# Patient Record
Sex: FEMALE | Race: WHITE | NOT HISPANIC OR LATINO
[De-identification: names, ages, dates, MRNs, and addresses within clinical notes are randomized per-mention and may not be internally consistent; named-entity substitution may affect disease eponyms.]

---

## 2019-07-16 ENCOUNTER — APPOINTMENT (OUTPATIENT)
Dept: BREAST CENTER | Facility: CLINIC | Age: 69
End: 2019-07-16
Payer: MEDICARE

## 2019-07-16 VITALS
WEIGHT: 220 LBS | HEART RATE: 70 BPM | DIASTOLIC BLOOD PRESSURE: 70 MMHG | BODY MASS INDEX: 34.53 KG/M2 | SYSTOLIC BLOOD PRESSURE: 130 MMHG | HEIGHT: 67 IN

## 2019-07-16 DIAGNOSIS — Z78.9 OTHER SPECIFIED HEALTH STATUS: ICD-10-CM

## 2019-07-16 DIAGNOSIS — Z86.79 PERSONAL HISTORY OF OTHER DISEASES OF THE CIRCULATORY SYSTEM: ICD-10-CM

## 2019-07-16 DIAGNOSIS — Z86.39 PERSONAL HISTORY OF OTHER ENDOCRINE, NUTRITIONAL AND METABOLIC DISEASE: ICD-10-CM

## 2019-07-16 DIAGNOSIS — Z86.59 PERSONAL HISTORY OF OTHER MENTAL AND BEHAVIORAL DISORDERS: ICD-10-CM

## 2019-07-16 DIAGNOSIS — Z87.09 PERSONAL HISTORY OF OTHER DISEASES OF THE RESPIRATORY SYSTEM: ICD-10-CM

## 2019-07-16 DIAGNOSIS — Z87.891 PERSONAL HISTORY OF NICOTINE DEPENDENCE: ICD-10-CM

## 2019-07-16 DIAGNOSIS — Z87.39 PERSONAL HISTORY OF OTHER DISEASES OF THE MUSCULOSKELETAL SYSTEM AND CONNECTIVE TISSUE: ICD-10-CM

## 2019-07-16 PROCEDURE — 99215 OFFICE O/P EST HI 40 MIN: CPT

## 2019-07-16 RX ORDER — DIGOXIN 0.06 MG/1
TABLET ORAL
Refills: 0 | Status: ACTIVE | COMMUNITY

## 2019-07-16 RX ORDER — METFORMIN HYDROCHLORIDE 625 MG/1
TABLET ORAL
Refills: 0 | Status: ACTIVE | COMMUNITY

## 2019-07-16 RX ORDER — ALPRAZOLAM 0.5 MG/1
0.5 TABLET ORAL
Refills: 0 | Status: ACTIVE | COMMUNITY

## 2019-07-16 RX ORDER — RANITIDINE 75 MG/1
TABLET ORAL
Refills: 0 | Status: ACTIVE | COMMUNITY

## 2019-07-16 RX ORDER — CARVEDILOL 3.12 MG/1
TABLET, FILM COATED ORAL
Refills: 0 | Status: ACTIVE | COMMUNITY

## 2019-07-16 RX ORDER — RIVAROXABAN 2.5 MG/1
TABLET, FILM COATED ORAL
Refills: 0 | Status: ACTIVE | COMMUNITY

## 2019-07-16 RX ORDER — ROSUVASTATIN CALCIUM 5 MG/1
TABLET, FILM COATED ORAL
Refills: 0 | Status: ACTIVE | COMMUNITY

## 2019-07-16 NOTE — ASSESSMENT
[FreeTextEntry1] : 68 yo female with dense breast\par never went to derm\par plan mg/sono AUG 2019\par We reviewed risk reduction strategies including maintaining a BMI <25, limiting red meat intake and alcoholic beverages to 3 per week and exercise (150 min/ week low intensity or 75 min/week high intensity). And maintaining a normal vitamin D level.\par f/u 1 year for CBE\par She knows to call or return sooner should any concerns or questions arise.\par \par \par

## 2019-07-16 NOTE — PHYSICAL EXAM
[Normocephalic] : normocephalic [Atraumatic] : atraumatic [Supple] : supple [No Supraclavicular Adenopathy] : no supraclavicular adenopathy [Examined in the supine and seated position] : examined in the supine and seated position [No dominant masses] : no dominant masses in right breast  [No dominant masses] : no dominant masses left breast [No Nipple Retraction] : no left nipple retraction [No Nipple Discharge] : no left nipple discharge [No Axillary Lymphadenopathy] : no left axillary lymphadenopathy [No Edema] : no edema [No Rashes] : no rashes [No Ulceration] : no ulceration [de-identified] : no skin thickening

## 2019-07-16 NOTE — HISTORY OF PRESENT ILLNESS
[FreeTextEntry1] : This is a 69 yr old female s/p right breast 6:00 N3 usd guided biopsy (heart shaped clip) on 02/05/2018. Pathology showed benign breast tissue with dilated ducts, mild ductal hyperplasia and adenosis with microcalcifications, not atypia or malignancy seen. Patient is s/p left breast stereotactic biopsy on 01/10/2017. Pathology showed fibroadenomatous nodules with microcalcifications, adenosis with microcalcifications, fibrocystic changes with microcalcifications and focal mild UDH. Patient originally referred by Dr. Brar for a left breast ultrasound BIRADS 3 showing intramammary lymph node in the left 3:00 axis 8 cm from the nipple likely corresponding with the mammographic abnormality.\par \par \par Patient has no breast complaints today.  She feels the left breast skin thickening has resolved. She never saw anyone for it.  She is s/p right UOQ stereo bx 8/14/18 pathology shows small FA with UDH.\par \par She does SBE. \par She has not noticed a change in her breast or a breast lump.\par She has not noticed a change in her nipple or nipple area.\par She has not noticed a change in the skin of the breast.\par She is not experiencing nipple discharge.\par She is not experiencing breast pain.\par She has not noticed a lump or lymph node under the armpit. \par \par BREAST CANCER RISK FACTORS\par Menarche: 12\par Date of LMP: 1994\par Menopause: post \par Grav: 2      Para: 2 \par Age at first live birth: 18\par Nursed: Yes\par Hysterectomy: partial hysterectomy 1994\par Oophorectomy: No \par OCP: Yes in the past \par HRT: No \par Last pap/pelvic exam: 10/2017   WNL \par Related family history: None\par Ashkenazi: N\par Tyrer-Aj/NCI lifetime risk: BRCAPRO 5.8%, TCv6 3.3%, TCv7 5.4%, Arpita 4.5% Jeremie Not Applicable \par BRCA testing: No\par \par Last mammogram:   01/29/2018               Location: [place]\par Report reviewed.                                 Images reviewed.\par Results: Birads 0 \par Additional imaging recommended. Done on 01/29/2018. \par \par Last ultrasound:  1/29/2018               Location: [place]\par Report reviewed.                                 Images reviewed.\par Results: Birads 4\par Mass in the 6:00 axis of the right breast represents a complicated cyst vs duct with internal tissue. USD guided biopsy is recommended. Done on 02/05/2018. \par \par Last MRI:      Never

## 2020-11-03 ENCOUNTER — APPOINTMENT (OUTPATIENT)
Dept: BREAST CENTER | Facility: CLINIC | Age: 70
End: 2020-11-03
Payer: MEDICARE

## 2020-11-03 VITALS — HEIGHT: 67 IN | WEIGHT: 220 LBS | BODY MASS INDEX: 34.53 KG/M2

## 2020-11-03 PROCEDURE — 99072 ADDL SUPL MATRL&STAF TM PHE: CPT

## 2020-11-03 PROCEDURE — 99215 OFFICE O/P EST HI 40 MIN: CPT

## 2020-11-03 NOTE — ASSESSMENT
[FreeTextEntry1] : 71 yo female with dense breast\par never went to derm\par plan mg/sono OCT 2021\par We reviewed risk reduction strategies including maintaining a BMI <25, limiting red meat intake and alcoholic beverages to 3 per week and exercise (150 min/ week low intensity or 75 min/week high intensity). And maintaining a normal vitamin D level.\par f/u 1 year for CBE\par She knows to call or return sooner should any concerns or questions arise.\par \par \par

## 2020-11-03 NOTE — HISTORY OF PRESENT ILLNESS
[FreeTextEntry1] : This is a 70 yr old female s/p right breast 6:00 N3 usd guided biopsy (heart shaped clip) on 02/05/2018. Pathology showed benign breast tissue with dilated ducts, mild ductal hyperplasia and adenosis with microcalcifications, not atypia or malignancy seen. Patient is s/p left breast stereotactic biopsy on 01/10/2017. Pathology showed fibroadenomatous nodules with microcalcifications, adenosis with microcalcifications, fibrocystic changes with microcalcifications and focal mild UDH. Patient originally referred by Dr. Brar for a left breast ultrasound BIRADS 3 showing intramammary lymph node in the left 3:00 axis 8 cm from the nipple likely corresponding with the mammographic abnormality.\par \par \par Patient has no breast complaints today.  She feels the left breast skin thickening has resolved. She never saw anyone for it.  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 \par She has not noticed a change in her breast or a breast lump.\par She has not noticed a change in her nipple or nipple area.\par She has not noticed a change in the skin of the breast.\par She is not experiencing nipple discharge.\par She is not experiencing breast pain.\par She has not noticed a lump or lymph node under the armpit. \par \par BREAST CANCER RISK FACTORS\par Menarche: 12\par Date of LMP: 1994\par Menopause: post \par Grav: 2      Para: 2 \par Age at first live birth: 18\par Nursed: Yes\par Hysterectomy: partial hysterectomy 1994\par Oophorectomy: No \par OCP: Yes in the past \par HRT: No \par Last pap/pelvic exam: 2019  WNL \par Related family history: None\par Ashkenazi: N\par Tyrer-Aj/NCI lifetime risk: BRCAPRO 5.8%, TCv6 3.3%, TCv7 5.4%, Arpita 4.5% Jeremie Not Applicable \par BRCA testing: No\par \par Last mammogram: 10/5/2020               Location: [place]\par Report reviewed.                                 Images reviewed.\par Results: Birads 2\par No suspicious findings.  \par \par Last ultrasound: 10/5/2020               Location: [place]\par Report reviewed.                                 Images reviewed.\par Results: Birads 2\par no suspicious findings.\par \par Last MRI:      Never

## 2020-11-03 NOTE — PHYSICAL EXAM
[Normocephalic] : normocephalic [Atraumatic] : atraumatic [Supple] : supple [No Supraclavicular Adenopathy] : no supraclavicular adenopathy [Examined in the supine and seated position] : examined in the supine and seated position [No dominant masses] : no dominant masses in right breast  [No dominant masses] : no dominant masses left breast [No Nipple Retraction] : no left nipple retraction [No Nipple Discharge] : no left nipple discharge [No Axillary Lymphadenopathy] : no left axillary lymphadenopathy [No Edema] : no edema [No Rashes] : no rashes [No Ulceration] : no ulceration [de-identified] : no skin thickening

## 2021-11-09 ENCOUNTER — NON-APPOINTMENT (OUTPATIENT)
Age: 71
End: 2021-11-09

## 2021-11-09 ENCOUNTER — APPOINTMENT (OUTPATIENT)
Dept: BREAST CENTER | Facility: CLINIC | Age: 71
End: 2021-11-09
Payer: MEDICARE

## 2021-11-09 VITALS — HEIGHT: 67 IN | BODY MASS INDEX: 35.31 KG/M2 | WEIGHT: 225 LBS

## 2021-11-09 DIAGNOSIS — R92.1 MAMMOGRAPHIC CALCIFICATION FOUND ON DIAGNOSTIC IMAGING OF BREAST: ICD-10-CM

## 2021-11-09 DIAGNOSIS — Z78.9 OTHER SPECIFIED HEALTH STATUS: ICD-10-CM

## 2021-11-09 DIAGNOSIS — N63.0 UNSPECIFIED LUMP IN UNSPECIFIED BREAST: ICD-10-CM

## 2021-11-09 PROCEDURE — 99213 OFFICE O/P EST LOW 20 MIN: CPT

## 2021-11-09 RX ORDER — AMLODIPINE BESYLATE 5 MG/1
TABLET ORAL
Refills: 0 | Status: DISCONTINUED | COMMUNITY
End: 2021-11-09

## 2021-11-09 RX ORDER — MULTIVITAMIN
CAPSULE ORAL
Refills: 0 | Status: DISCONTINUED | COMMUNITY
End: 2021-11-09

## 2021-11-09 RX ORDER — B-COMPLEX WITH VITAMIN C
TABLET ORAL
Refills: 0 | Status: ACTIVE | COMMUNITY

## 2021-11-09 RX ORDER — CYANOCOBALAMIN (VITAMIN B-12) 1000 MCG
TABLET ORAL
Refills: 0 | Status: DISCONTINUED | COMMUNITY
End: 2021-11-09

## 2021-11-09 NOTE — HISTORY OF PRESENT ILLNESS
[FreeTextEntry1] : This is a 71 yr old female s/p right breast 6:00 N3 usd guided biopsy (heart shaped clip) on 02/05/2018. Pathology showed benign breast tissue with dilated ducts, mild ductal hyperplasia and adenosis with microcalcifications, not atypia or malignancy seen. Patient is s/p left breast stereotactic biopsy on 01/10/2017. Pathology showed fibroadenomatous nodules with microcalcifications, adenosis with microcalcifications, fibrocystic changes with microcalcifications and focal mild UDH. Patient originally referred by Dr. Brar for a left breast ultrasound BIRADS 3 showing intramammary lymph node in the left 3:00 axis 8 cm from the nipple likely corresponding with the mammographic abnormality.\par \par \par Patient has no breast complaints today.  She feels the left breast skin thickening has resolved. She never saw anyone for it.  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 She had her left knee replaced May 2021.\par \par She does SBE. \par She has not noticed a change in her breast or a breast lump.\par She has not noticed a change in her nipple or nipple area.\par She has not noticed a change in the skin of the breast.\par She is not experiencing nipple discharge.\par She is not experiencing breast pain.\par She has not noticed a lump or lymph node under the armpit. \par \par BREAST CANCER RISK FACTORS\par Menarche: 12\par Date of LMP: 1994\par Menopause: post \par Grav: 2      Para: 2 \par Age at first live birth: 18\par Nursed: Yes\par Hysterectomy: partial hysterectomy 1994\par Oophorectomy: No \par OCP: Yes in the past \par HRT: No \par Last pap/pelvic exam: 2019 WNL \par Related family history: None\par Ashkenazi: N\par Tyrer-[place]/NCI lifetime risk: BRCAPRO 5.8%, TCv6 3.3%, TCv7 5.4%, Arpita 4.5% Jeremie Not Applicable \par BRCA testing: No\par \par Last mammogram: 10/5/2020               Location: [place]\par Report reviewed.                                 Images reviewed.\par Results: Birads 2\par No suspicious findings.  \par \par Last ultrasound: 10/5/2020               Location: [place]\par Report reviewed.                                 Images reviewed.\par Results: Birads 2\par no suspicious findings.\par \par Last MRI:      Never

## 2021-11-09 NOTE — PHYSICAL EXAM
[Normocephalic] : normocephalic [Atraumatic] : atraumatic [Supple] : supple [No Supraclavicular Adenopathy] : no supraclavicular adenopathy [Examined in the supine and seated position] : examined in the supine and seated position [No dominant masses] : no dominant masses in right breast  [No dominant masses] : no dominant masses left breast [No Nipple Retraction] : no left nipple retraction [No Nipple Discharge] : no left nipple discharge [No Axillary Lymphadenopathy] : no left axillary lymphadenopathy [No Edema] : no edema [No Rashes] : no rashes [No Ulceration] : no ulceration [de-identified] : no skin thickening

## 2021-11-09 NOTE — ASSESSMENT
[FreeTextEntry1] : 70 yo female with dense breast\par never went to derm\par plan mg/sono OCT 2021-had to r/s to DEC 2021\par We reviewed risk reduction strategies including maintaining a BMI <25, limiting red meat intake and alcoholic beverages to 3 per week and exercise (150 min/ week low intensity or 75 min/week high intensity). And maintaining a normal vitamin D level.\par f/u 1 year for CBE as long as imaging normal\par She knows to call or return sooner should any concerns or questions arise.\par \par \par

## 2022-02-16 ENCOUNTER — NON-APPOINTMENT (OUTPATIENT)
Age: 72
End: 2022-02-16

## 2022-12-13 ENCOUNTER — APPOINTMENT (OUTPATIENT)
Dept: BREAST CENTER | Facility: CLINIC | Age: 72
End: 2022-12-13

## 2023-02-14 ENCOUNTER — APPOINTMENT (OUTPATIENT)
Dept: BREAST CENTER | Facility: CLINIC | Age: 73
End: 2023-02-14
Payer: MEDICARE

## 2023-02-14 VITALS — BODY MASS INDEX: 35.31 KG/M2 | WEIGHT: 225 LBS | HEIGHT: 67 IN

## 2023-02-14 DIAGNOSIS — Z80.3 FAMILY HISTORY OF MALIGNANT NEOPLASM OF BREAST: ICD-10-CM

## 2023-02-14 DIAGNOSIS — B36.9 SUPERFICIAL MYCOSIS, UNSPECIFIED: ICD-10-CM

## 2023-02-14 DIAGNOSIS — Z12.39 ENCOUNTER FOR OTHER SCREENING FOR MALIGNANT NEOPLASM OF BREAST: ICD-10-CM

## 2023-02-14 DIAGNOSIS — N60.19 DIFFUSE CYSTIC MASTOPATHY OF UNSPECIFIED BREAST: ICD-10-CM

## 2023-02-14 DIAGNOSIS — Z80.1 FAMILY HISTORY OF MALIGNANT NEOPLASM OF TRACHEA, BRONCHUS AND LUNG: ICD-10-CM

## 2023-02-14 PROCEDURE — 99213 OFFICE O/P EST LOW 20 MIN: CPT

## 2023-02-14 RX ORDER — TRIAMCINOLONE ACETONIDE 55 MCG
AEROSOL WITH ADAPTER (GRAM) NASAL
Refills: 0 | Status: DISCONTINUED | COMMUNITY
End: 2023-02-14

## 2023-02-14 RX ORDER — GLIPIZIDE 2.5 MG/1
TABLET ORAL
Refills: 0 | Status: DISCONTINUED | COMMUNITY
End: 2023-02-14

## 2023-02-14 RX ORDER — BLOOD SUGAR DIAGNOSTIC
STRIP MISCELLANEOUS
Qty: 50 | Refills: 0 | Status: ACTIVE | COMMUNITY
Start: 2023-02-01

## 2023-02-14 RX ORDER — LEVOTHYROXINE SODIUM 0.05 MG/1
50 TABLET ORAL
Qty: 30 | Refills: 0 | Status: ACTIVE | COMMUNITY
Start: 2022-10-03

## 2023-02-14 RX ORDER — DICLOFENAC SODIUM 1 %
KIT TOPICAL
Refills: 0 | Status: DISCONTINUED | COMMUNITY
End: 2023-02-14

## 2023-02-14 RX ORDER — FUROSEMIDE 80 MG/1
TABLET ORAL
Refills: 0 | Status: DISCONTINUED | COMMUNITY
End: 2023-02-14

## 2023-02-14 RX ORDER — LOSARTAN POTASSIUM 100 MG/1
TABLET, FILM COATED ORAL
Refills: 0 | Status: DISCONTINUED | COMMUNITY
End: 2023-02-14

## 2023-02-14 RX ORDER — LEVOTHYROXINE SODIUM 137 UG/1
TABLET ORAL
Refills: 0 | Status: DISCONTINUED | COMMUNITY
End: 2023-02-14

## 2023-02-14 RX ORDER — LORATADINE 10 MG/1
CAPSULE, LIQUID FILLED ORAL
Refills: 0 | Status: DISCONTINUED | COMMUNITY
End: 2023-02-14

## 2023-02-14 RX ORDER — MUPIROCIN 20 MG/G
2 OINTMENT TOPICAL
Qty: 22 | Refills: 0 | Status: ACTIVE | COMMUNITY
Start: 2023-01-24

## 2023-02-14 RX ORDER — FUROSEMIDE 40 MG/1
40 TABLET ORAL
Qty: 180 | Refills: 0 | Status: ACTIVE | COMMUNITY
Start: 2023-02-08

## 2023-02-14 RX ORDER — ASCORBIC ACID 500 MG
TABLET ORAL
Refills: 0 | Status: DISCONTINUED | COMMUNITY
End: 2023-02-14

## 2023-02-14 RX ORDER — FOLIC ACID 20 MG
CAPSULE ORAL
Refills: 0 | Status: DISCONTINUED | COMMUNITY
End: 2023-02-14

## 2023-02-14 RX ORDER — NYSTATIN AND TRIAMCINOLONE ACETONIDE 100000; 1 MG/G; MG/G
100000-0.1 CREAM TOPICAL TWICE DAILY
Qty: 90 | Refills: 1 | Status: ACTIVE | COMMUNITY
Start: 2023-02-14 | End: 1900-01-01

## 2023-02-14 RX ORDER — KETOTIFEN FUMARATE 0.25 MG/ML
0.03 SOLUTION OPHTHALMIC
Refills: 0 | Status: DISCONTINUED | COMMUNITY
End: 2023-02-14

## 2023-02-14 RX ORDER — GLIPIZIDE 5 MG/1
5 TABLET, FILM COATED, EXTENDED RELEASE ORAL
Qty: 30 | Refills: 0 | Status: ACTIVE | COMMUNITY
Start: 2022-11-15

## 2023-02-14 RX ORDER — IRBESARTAN 150 MG/1
150 TABLET ORAL
Refills: 0 | Status: DISCONTINUED | COMMUNITY
End: 2023-02-14

## 2023-02-14 NOTE — PHYSICAL EXAM
[Normocephalic] : normocephalic [Atraumatic] : atraumatic [Supple] : supple [No Supraclavicular Adenopathy] : no supraclavicular adenopathy [Examined in the supine and seated position] : examined in the supine and seated position [No dominant masses] : no dominant masses in right breast  [No dominant masses] : no dominant masses left breast [No Nipple Retraction] : no left nipple retraction [No Nipple Discharge] : no left nipple discharge [No Axillary Lymphadenopathy] : no left axillary lymphadenopathy [No Edema] : no edema [No Rashes] : no rashes [No Ulceration] : no ulceration [de-identified] : no skin thickening [de-identified] : there is a fungal rash axillary fold

## 2023-02-14 NOTE — HISTORY OF PRESENT ILLNESS
[FreeTextEntry1] : This is a 72 yr old female s/p right breast 6:00 N3 usd guided biopsy (heart shaped clip) on 02/05/2018. Pathology showed benign breast tissue with dilated ducts, mild ductal hyperplasia and adenosis with microcalcifications, not atypia or malignancy seen. Patient is s/p left breast stereotactic biopsy on 01/10/2017. Pathology showed fibroadenomatous nodules with microcalcifications, adenosis with microcalcifications, fibrocystic changes with microcalcifications and focal mild UDH. Patient originally referred by Dr. Brar for a left breast ultrasound BIRADS 3 showing intramammary lymph node in the left 3:00 axis 8 cm from the nipple likely corresponding with the mammographic abnormality.\par \par \par Patient has no breast complaints today.  She feels the left breast skin thickening has resolved. She never saw anyone for it.  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 She had her left knee replaced May 2021.\par Her  passed of lung and liver cancer NOV 2022.\par \par She does SBE. \par She has not noticed a change in her breast or a breast lump.\par She has not noticed a change in her nipple or nipple area.\par She has not noticed a change in the skin of the breast.\par She is not experiencing nipple discharge.\par She is not experiencing breast pain.\par She has not noticed a lump or lymph node under the armpit. \par \par BREAST CANCER RISK FACTORS\par Menarche: 12\par Date of LMP: 1994\par Menopause: post \par Grav: 2      Para: 2 \par Age at first live birth: 18\par Nursed: Yes\par Hysterectomy: partial hysterectomy 1994\par Oophorectomy: No \par OCP: Yes in the past \par HRT: No \par Last pap/pelvic exam: 2019 WNL \par Related family history: None\par Ashkenazi: N\par Tyrer-Aj/NCI lifetime risk: BRCAPRO 5.8%, TCv6 3.3%, TCv7 5.4%, Arpita 4.5% Jeremie Not Applicable \par BRCA testing: No\par \par Last mammogram: 2/14/2022  right       Location: [place]\par Report reviewed.                                 Images reviewed.\par Results: Birads 2\par No mammographic evidence of malignancy in area of prev mammographic concern in the right breast. Prev reported asymmetry corresponds to a skin lesion.  \par \par Last ultrasound: 1/17/2022                  Location: [place] \par Report reviewed.                                 Images reviewed.\par Results: Birads 2\par no suspicious findings.\par \par Last MRI:      Never

## 2023-02-14 NOTE — ASSESSMENT
[FreeTextEntry1] : 71 yo female with dense breast\par never went to derm\par plan mg/sono due now\par We reviewed risk reduction strategies including maintaining a BMI <25, limiting red meat intake and alcoholic beverages to 3 per week and exercise (150 min/ week low intensity or 75 min/week high intensity). And maintaining a normal vitamin D level.\par f/u 1 year for CBE as long as imaging normal\par She knows to call or return sooner should any concerns or questions arise.\par \par \par

## 2023-02-14 NOTE — CONSULT LETTER
[Dear  ___] : Dear  [unfilled], [Courtesy Letter:] : I had the pleasure of seeing your patient, [unfilled], in my office today. [Please see my note below.] : Please see my note below. [Consult Closing:] : Thank you very much for allowing me to participate in the care of this patient.  If you have any questions, please do not hesitate to contact me. [Sincerely,] : Sincerely, [FreeTextEntry3] : Umm Han MS DO\par Breast Surgeon\par [facility] \[place] [address]\par

## 2023-05-21 ENCOUNTER — RESULT REVIEW (OUTPATIENT)
Age: 73
End: 2023-05-21

## 2024-02-09 NOTE — HISTORY OF PRESENT ILLNESS
[FreeTextEntry1] : This is a 73 yr old female s/p right breast 6:00 N3 usd guided biopsy (heart shaped clip) on 02/05/2018. Pathology showed benign breast tissue with dilated ducts, mild ductal hyperplasia and adenosis with microcalcifications, not atypia or malignancy seen. Patient is s/p left breast stereotactic biopsy on 01/10/2017. Pathology showed fibroadenomatous nodules with microcalcifications, adenosis with microcalcifications, fibrocystic changes with microcalcifications and focal mild UDH. Patient originally referred by Dr. Brar for a left breast ultrasound BIRADS 3 showing intramammary lymph node in the left 3:00 axis 8 cm from the nipple likely corresponding with the mammographic abnormality.   Patient has no breast complaints today.  She feels the left breast skin thickening has resolved. She never saw anyone for it.  She is s/p right UOQ stereo bx 8/14/18 pathology shows small FA with UDH. 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 She had her left knee replaced May 2021. Her  passed of lung and liver cancer NOV 2022.  She does SBE.  She has not noticed a change in her breast or a breast lump. She has not noticed a change in her nipple or nipple area. She has not noticed a change in the skin of the breast. She is not experiencing nipple discharge. She is not experiencing breast pain. She has not noticed a lump or lymph node under the armpit.   BREAST CANCER RISK FACTORS Menarche: 12 Date of LMP: 1994 Menopause: post  Grav: 2      Para: 2  Age at first live birth: 18 Nursed: Yes Hysterectomy: partial hysterectomy 1994 Oophorectomy: No  OCP: Yes in the past  HRT: No  Last pap/pelvic exam: 2019 WNL  Related family history: None Ashkenazi: N Tyrer-[place]/NCI lifetime risk: BRCAPRO 5.8%, TCv6 3.3%, TCv7 5.4%, Arpita 4.5% Jeremie Not Applicable  BRCA testing: No  Last mammogram: 05/22/2023       Location: [place] Report reviewed.                                 Images reviewed. Results: Birads 2 No mammographic evidence of malignancy.  Last ultrasound: 05/22/2023               Location: [place]  Report reviewed.                                 Images reviewed. Results: Birads 2 no suspicious findings.  Last MRI:      Never

## 2024-02-09 NOTE — ASSESSMENT
[FreeTextEntry1] : 73 yo female with dense breast\par  never went to derm\par  plan mg/sono due now\par  We reviewed risk reduction strategies including maintaining a BMI <25, limiting red meat intake and alcoholic beverages to 3 per week and exercise (150 min/ week low intensity or 75 min/week high intensity). And maintaining a normal vitamin D level.\par  f/u 1 year for CBE as long as imaging normal\par  She knows to call or return sooner should any concerns or questions arise.\par  \par  \par

## 2024-02-09 NOTE — PHYSICAL EXAM
[Normocephalic] : normocephalic [Atraumatic] : atraumatic [Supple] : supple [No Supraclavicular Adenopathy] : no supraclavicular adenopathy [Examined in the supine and seated position] : examined in the supine and seated position [No dominant masses] : no dominant masses in right breast  [No dominant masses] : no dominant masses left breast [No Nipple Retraction] : no left nipple retraction [No Nipple Discharge] : no left nipple discharge [No Axillary Lymphadenopathy] : no left axillary lymphadenopathy [No Edema] : no edema [No Rashes] : no rashes [No Ulceration] : no ulceration [de-identified] : no skin thickening [de-identified] : there is a fungal rash axillary fold

## 2024-02-09 NOTE — CONSULT LETTER
[Dear  ___] : Dear  [unfilled], [Courtesy Letter:] : I had the pleasure of seeing your patient, [unfilled], in my office today. [Please see my note below.] : Please see my note below. [Consult Closing:] : Thank you very much for allowing me to participate in the care of this patient.  If you have any questions, please do not hesitate to contact me. [Sincerely,] : Sincerely, [FreeTextEntry3] : Umm Han MS DO\par  Breast Surgeon\par  [facility] \[place] [address]\par

## 2024-03-12 ENCOUNTER — APPOINTMENT (OUTPATIENT)
Dept: BREAST CENTER | Facility: CLINIC | Age: 74
End: 2024-03-12